# Patient Record
Sex: MALE | Race: WHITE | NOT HISPANIC OR LATINO | Employment: FULL TIME | URBAN - METROPOLITAN AREA
[De-identification: names, ages, dates, MRNs, and addresses within clinical notes are randomized per-mention and may not be internally consistent; named-entity substitution may affect disease eponyms.]

---

## 2018-01-17 ENCOUNTER — LAB REQUISITION (OUTPATIENT)
Dept: LAB | Facility: HOSPITAL | Age: 58
End: 2018-01-17
Payer: OTHER GOVERNMENT

## 2018-01-17 ENCOUNTER — ALLSCRIPTS OFFICE VISIT (OUTPATIENT)
Dept: OTHER | Facility: OTHER | Age: 58
End: 2018-01-17

## 2018-01-17 DIAGNOSIS — N20.1 CALCULUS OF URETER: ICD-10-CM

## 2018-01-17 DIAGNOSIS — N13.5 CROSSING VESSEL AND STRICTURE OF URETER WITHOUT HYDRONEPHROSIS: ICD-10-CM

## 2018-01-17 LAB
CLARITY UR: NORMAL
COLOR UR: YELLOW
GLUCOSE (HISTORICAL): NORMAL
HGB UR QL STRIP.AUTO: NORMAL
KETONES UR STRIP-MCNC: NORMAL MG/DL
LEUKOCYTE ESTERASE UR QL STRIP: NORMAL
NITRITE UR QL STRIP: NORMAL
PH UR STRIP.AUTO: 5 [PH]
PROT UR STRIP-MCNC: NORMAL MG/DL
SP GR UR STRIP.AUTO: 1.01

## 2018-01-17 PROCEDURE — 87086 URINE CULTURE/COLONY COUNT: CPT | Performed by: UROLOGY

## 2018-01-18 LAB — BACTERIA UR CULT: NORMAL

## 2018-01-18 NOTE — CONSULTS
Assessment   1  Hydronephrosis (591) (N13 30)   2  UPJ (ureteropelvic junction) obstruction (593 4) (N13 5)   3  Bladder cancer (188 9) (C67 9)    Plan   Hydronephrosis, UPJ (ureteropelvic junction) obstruction    · Urine Dip Non-Automated- POC; Status:Complete - Retrospective By Protocol    Authorization;   Done: [id number] 02:01PM   Performed: In Office; [id number]; Last Updated By:Nikky Daley; 1/17/2018 2:03:04 PM;Ordered;For:Hydronephrosis, UPJ (ureteropelvic junction) obstruction; Ordered By:Nhi Capellan;  UPJ (ureteropelvic junction) obstruction, Ureteral stone    · (1) URINE CULTURE; Source:Urine, Clean Catch; Status:Active - Retrospective By    Protocol Authorization; Requested [id number]; Perform:[facility]; [id number]; Last Updated By:Nikky Daley; 1/17/2018 2:28:09 PM;Ordered; For:UPJ (ureteropelvic junction) obstruction, Ureteral stone; Ordered By:Nhi Capellan;  Ureteral stone    · Schedule Surgery Treatment  Procedure:  Cystoscopy, right ureteroscopy, laser    lithotripsy, stone extraction, right retrograde pyelogram, and right ureteral stent    placement  Needs cardiac clearance  Status: Hold For - Scheduling     Requested for: [id number]   Ordered; For: Ureteral stone; Ordered By: Torsten Shaffer Performed:  Due: [id number]    Discussion/Summary   Discussion Summary:    51-year-old male with nephrolithiasis  discussed that on the right side he has a very large stone seems to be obstructing his kidney side lately  He needs to proceed with right-sided ureteroscopy with laser lithotripsy stone extraction and stent placement  Risks and benefits were discussed  We discussed that the stone may be impacted and difficult to get to and he may need 2 procedures 1 in which we just stent the kidney  He was consented for the surgery will schedule in the near future        Chief Complaint   Chief Complaint Free Text Note Form: Patient presents for Right UPJ stone, Hydronephrosis      History of Present Illness   HPI: 44-year-old male presents for evaluation of nephrolithiasis  The patient was noted to have bilateral stones on a recent CT scan that was done during hospitalization for congestive heart failure  On the right side there was a 1 5 x 0 9 centimeter UPJ stone with associated hydronephrosis  The patient denies any pain or symptoms from this  He denies any gross hematuria  He does have a distant history of bladder cancer that was diagnosed in 2008 when he had a kidney stone episode  The tumors were noted during cystoscopy for planned ureteroscopy  It sounds like he followed up for a few years and then stopped following up with Urology for surveillance  He denies any gross hematuria  Review of Systems   Complete-Male Urology:      Constitutional: No fever or chills, feels well, no tiredness, no recent weight gain or weight loss  Respiratory: No complaints of shortness of breath, no wheezing, no cough, no SOB on exertion, no orthopnea or PND  Cardiovascular: No complaints of slow heart rate, no fast heart rate, no chest pain, no palpitations, no leg claudication, no lower extremity  Gastrointestinal: No complaints of abdominal pain, no constipation, no nausea or vomiting, no diarrhea or bloody stools  Genitourinary: Empty sensation-- and-- stream quality good, but-- no dysuria,-- no urinary hesitancy,-- no hematuria,-- no incontinence,-- no nocturia-- and-- no feelings of urinary urgency  Musculoskeletal: No complaints of arthralgia, no myalgias, no joint swelling or stiffness, no limb pain or swelling  Integumentary: No complaints of skin rash or skin lesions, no itching, no skin wound, no dry skin  Hematologic/Lymphatic: No complaints of swollen glands, no swollen glands in the neck, does not bleed easily, no easy bruising        Neurological: No compliants of headache, no confusion, no convulsions, no numbness or tingling, no dizziness or fainting, no limb weakness, no difficulty walking  ROS Reviewed:    ROS reviewed  Active Problems   1  Hydronephrosis (591) (N13 30)   2  UPJ (ureteropelvic junction) obstruction (593 4) (N13 5)    Past Medical History   Active Problems And Past Medical History Reviewed: The active problems and past medical history were reviewed and updated today  Surgical History   Surgical History Reviewed: The surgical history was reviewed and updated today  Family History   Family History Reviewed: The family history was reviewed and updated today  Social History   Social History Reviewed: The social history was reviewed and updated today  Current Meds    1  Adult Aspirin Low Strength 81 MG TBDP; Therapy: (Recorded:17Jan2018) to Recorded   2  Atorvastatin Calcium 20 MG Oral Tablet; Therapy: (Recorded:17Jan2018) to Recorded   3  Benazepril HCl - 40 MG Oral Tablet; Therapy: (Recorded:17Jan2018) to Recorded   4  Furosemide 40 MG Oral Tablet; Therapy: (Recorded:17Jan2018) to Recorded   5  Levaquin 750 MG Oral Tablet; Therapy: [id number] to (Last Rx:11Mar2011)  Requested for: 53UDG2987 Ordered   6  MetFORMIN HCl ER (OSM) 1000 MG Oral Tablet Extended Release 24 Hour; Therapy: (Recorded:17Jan2018) to Recorded   7  Metoprolol Succinate ER 50 MG Oral Tablet Extended Release 24 Hour; Therapy: 65MEP6160 to (Last Kyung Ebbing)  Requested for: 26Apr2011 Ordered   8  Spironolactone 25 MG Oral Tablet; Therapy: (Recorded:17Jan2018) to Recorded  Medication List Reviewed: The medication list was reviewed and updated today  Vitals   Vital Signs    Recorded: 49MLR3896 02:00PM   Heart Rate 88   Systolic 929   Diastolic 80   Height 5 ft 10 in   Weight 342 lb    BMI Calculated 49 07   BSA Calculated 2 62     Physical Exam        Constitutional      General appearance: Abnormal  -- Obese  Pulmonary      Respiratory effort: No increased work of breathing or signs of respiratory distress  Cardiovascular      Examination of extremities for edema and/or varicosities: Normal        Abdomen      Abdomen: Non-tender, no masses  Liver and spleen: No hepatomegaly or splenomegaly  Musculoskeletal      Gait and station: Normal        Skin      Skin and subcutaneous tissue: Normal without rashes or lesions         Results/Data   Urine Dip Non-Automated- POC [id number] 02:01PM Frederick Hu      Test Name Result Flag Reference   Color Yellow     Clarity Transparent     Leukocytes -     Nitrite -     Blood +++     Protein -     Ph 5 0     Specific Gravity 1 015     Ketone -     Glucose -          Signatures    Electronically signed by : CHASTITY Ayala ; Jan 17 2018  2:59PM EST                       (Author)

## 2018-01-22 VITALS
HEART RATE: 88 BPM | WEIGHT: 315 LBS | BODY MASS INDEX: 45.1 KG/M2 | DIASTOLIC BLOOD PRESSURE: 80 MMHG | HEIGHT: 70 IN | SYSTOLIC BLOOD PRESSURE: 152 MMHG

## 2018-01-24 ENCOUNTER — PREP FOR PROCEDURE (OUTPATIENT)
Dept: UROLOGY | Facility: HOSPITAL | Age: 58
End: 2018-01-24

## 2018-01-24 DIAGNOSIS — N20.1 CALCULUS OF URETER: Primary | ICD-10-CM

## 2018-02-01 ENCOUNTER — TELEPHONE (OUTPATIENT)
Dept: UROLOGY | Facility: AMBULATORY SURGERY CENTER | Age: 58
End: 2018-02-01

## 2018-02-27 RX ORDER — ACETAMINOPHEN 500 MG
1000 TABLET ORAL 2 TIMES DAILY
COMMUNITY

## 2018-02-27 RX ORDER — BENAZEPRIL HYDROCHLORIDE 40 MG/1
40 TABLET, FILM COATED ORAL DAILY
COMMUNITY

## 2018-02-27 RX ORDER — MAGNESIUM OXIDE 400 MG/1
400 TABLET ORAL 2 TIMES DAILY
COMMUNITY

## 2018-02-27 RX ORDER — OMEPRAZOLE 20 MG/1
20 CAPSULE, DELAYED RELEASE ORAL DAILY
COMMUNITY

## 2018-02-27 RX ORDER — SPIRONOLACTONE 25 MG/1
25 TABLET ORAL 2 TIMES DAILY
COMMUNITY

## 2018-02-27 RX ORDER — FUROSEMIDE 40 MG/1
40 TABLET ORAL 2 TIMES DAILY
COMMUNITY

## 2018-02-27 RX ORDER — FERROUS SULFATE 325(65) MG
325 TABLET ORAL 2 TIMES DAILY WITH MEALS
COMMUNITY

## 2018-02-27 RX ORDER — METOPROLOL TARTRATE 50 MG/1
75 TABLET, FILM COATED ORAL EVERY 12 HOURS SCHEDULED
COMMUNITY

## 2018-02-27 RX ORDER — ASPIRIN 81 MG/1
81 TABLET ORAL DAILY
COMMUNITY

## 2018-02-27 NOTE — PRE-PROCEDURE INSTRUCTIONS
The following information was developed to assist you to prepare for your operation  What do I need to do before coming to the hospital?  · Arrange for a responsible person to drive you to and from the hospital   · Arrange care for your children at home  Children are not allowed in the recovery area of the hospital   · Plan to wear clothing that is easy to put on and take off  If you are having shoulder surgery, wear a shirt that buttons or zippers in front  Bathing  · Shower the evening before and the morning of your surgery with antibacterial soap  Please refer to the Pre Op Showering Instructions for Surgery Patient Sheet  · Remove nail polish and all body piercing jewelry  · Do not shave any body part for at least 24 hours before surgery-this includes face, arm, legs and upper body  Food  · Nothing to eat or drink after midnight the night before your surgery  This includes candy and chewing gum  Exception: If your surgery is after 12:00 pm (noon), you may have clear liquids such as 7-Up, ginger ale, apple or cranberry juice, Jello-O, water, or clear broth until 8:00 am   · Do not drink mild or juice with pulp on the morning before surgery  · Do not drink alcohol 24 hours before surgery  · Do not smoke 12 hours before surgery  Medicine  · Follow instructions you are received from your surgeon about which medicines you may take on the day of surgery  · If instructed to take medicine on the morning of surgery, take pills with just a small sip of water  Call your prescribing doctor for specific instructions on what to do if you take insulin  What should I bring to the hospital?  Bring  · Crutches or a walker, if you have them, for foot or knee surgery  · A list of the daily medications, vitamins, minerals, herbals and nutritional supplements you take  Include the dosages of medicines and the time you take them each day    · Glasses, dentures or hearing aids  · Minimal clothing; you will be wearing hospital sleepwear  · Photo ID; required to verify your identity  · If you have a Living Will or Power of , bring a copy of the documents  (only if being admitted)  · If you have an ostomy, bring an extra pouch and any supplies you use  Do Not Bring  · Medicines or Inhalers  · Money, valuables or jewelry    What other information should I know about the day of surgery? · Notify your surgeon if you develop a cold, sore throat, cough, fever, rash or any other illness  · Report to the Ambulatory Surgical/Same Day Surgery Unit  You will be instructed to stop at Registration only if you have not been pre-registered  · Inform your  if they do not stay that they will be asked by the staff to leave a phone number where they can be reached  · Be available to be reached before surgery  In the even the operating room schedule changes, you may be asked to come in earlier or later than expected  It is important to tell your doctor and others involved in your health care if you are taking or have been taking any non-prescription drugs, vitamins, minerals, herbals or other nutritional supplements  Any of these may interact with some food or medicines and cause a reaction  Before your operation, you play an important role in decreasing your risk for infection by washing with special antiseptic soap  This is an effective way to reduce bacteria on the skin which may help to prevent infections at the surgical site  Please read the following directions in advance  2  In the week before your operation purchase a 4 ounce bottle of antiseptic soap containing chlorhexidine gluconate 4%  Some brand names include: Aplicare, Endure, and Hibiclens  The cost is usually less than $5 00  · For your convenience, the [address] [address] carries the soap  · It may also be available at your doctor's office or pre-admission testing center, and at most retail pharmacies    · If you are allergic or sensitive to soaps containing chlorhexidine gluconate (CHG), please let your doctor know so another antiseptic soap can be suggested  · CHG antiseptic soap is for external use only  2      The day before your operation follow these directions carefully to get ready  · Place clean lines (sheets) on your bed; you should sleep on clean sheets after your evening shower  · Get clean towels and washcloths ready - you need enough for 2 showers  · Set aside clean underwear, pajamas, and clothing to wear after the shower  Reminders:  · DO NOT use any other soap or body rinse on your skin during or after the antiseptic showers  · DO NOT use lotion , powder, deodorant, or perfume/aftershave of any kind on your skin after your antiseptic shower  · DO NOT shave any body parts in the 24 hours/the day before your operation  · DO NOT get the antiseptic soap in your eyes, ears, nose, mouth, or vaginal area  3      You will need to shower the night before AND the morning of your Surgery  Shower 1:  · The evening before your operation, take the fist shower  · First, shampoo your hair with regular shampoo and rinse it completely before you use the anitseptic soap  After washing and rinsing your hair, rinse your body  · Next, use a clean wash cloth to apply the antiseptic soap and wash your body from the neck down to your toes using 1/2 bottle of the antiseptic soap  You will use the other 1/2 bottle for the second shower  · Clean the area where your incision will be; later this area well for about 2 minutes  · If you ar having head or neck surgery, wash areas with the antiseptic soap  · Rinse yourself completely with running water  · Use a clean towel to dry off  · Wear clean underwear and clothing/pajamas  Shower 2:  · The Morning of your operation, take the second shower following the same steps as Shower 1 using the second 1/2 of the bottle of antiseptic soap    · Use clean cloths and towels to was and dry yourself off  · Wear clean underwear and clothing  Pre-Surgery Instructions:   Medication Instructions    acetaminophen (TYLENOL) 500 mg tablet Patient was instructed by Physician and understands   ACIDOPHILUS LACTOBACILLUS PO Patient was instructed by Physician and understands   aspirin (ECOTRIN LOW STRENGTH) 81 mg EC tablet Patient was instructed by Physician and understands   ATORVASTATIN CALCIUM PO Patient was instructed by Physician and understands   benazepril (LOTENSIN) 40 MG tablet Patient was instructed by Physician and understands   cyanocobalamin 1000 MCG tablet Patient was instructed by Physician and understands   ferrous sulfate 325 (65 Fe) mg tablet Patient was instructed by Physician and understands   furosemide (LASIX) 40 mg tablet Patient was instructed by Physician and understands   magnesium oxide (MAG-OX) 400 mg tablet Patient was instructed by Physician and understands   metFORMIN (GLUCOPHAGE) 1000 MG tablet Instructed patient per Anesthesia Guidelines   metoprolol tartrate (LOPRESSOR) 50 mg tablet Instructed patient per Anesthesia Guidelines   spironolactone (ALDACTONE) 25 mg tablet Instructed patient per Anesthesia Guidelines

## 2018-03-02 ENCOUNTER — ANESTHESIA EVENT (OUTPATIENT)
Dept: PERIOP | Facility: HOSPITAL | Age: 58
End: 2018-03-02
Payer: OTHER GOVERNMENT

## 2018-03-02 ENCOUNTER — APPOINTMENT (OUTPATIENT)
Dept: RADIOLOGY | Facility: HOSPITAL | Age: 58
End: 2018-03-02
Payer: OTHER GOVERNMENT

## 2018-03-02 ENCOUNTER — HOSPITAL ENCOUNTER (OUTPATIENT)
Facility: HOSPITAL | Age: 58
Setting detail: OUTPATIENT SURGERY
Discharge: HOME | End: 2018-03-02
Attending: UROLOGY | Admitting: UROLOGY
Payer: OTHER GOVERNMENT

## 2018-03-02 ENCOUNTER — ANESTHESIA (OUTPATIENT)
Dept: PERIOP | Facility: HOSPITAL | Age: 58
End: 2018-03-02
Payer: OTHER GOVERNMENT

## 2018-03-02 VITALS
BODY MASS INDEX: 45.1 KG/M2 | HEIGHT: 70 IN | TEMPERATURE: 97.5 F | OXYGEN SATURATION: 95 % | WEIGHT: 315 LBS | SYSTOLIC BLOOD PRESSURE: 143 MMHG | HEART RATE: 92 BPM | DIASTOLIC BLOOD PRESSURE: 84 MMHG | RESPIRATION RATE: 20 BRPM

## 2018-03-02 DIAGNOSIS — N20.1 CALCULUS OF URETER: ICD-10-CM

## 2018-03-02 LAB
ANION GAP SERPL CALCULATED.3IONS-SCNC: 12 MMOL/L (ref 4–13)
APTT PPP: 24 SECONDS (ref 23–35)
BASOPHILS # BLD AUTO: 0.05 THOUSANDS/ΜL (ref 0–0.1)
BASOPHILS NFR BLD AUTO: 1 % (ref 0–1)
BUN SERPL-MCNC: 18 MG/DL (ref 5–25)
CALCIUM SERPL-MCNC: 9.2 MG/DL (ref 8.3–10.1)
CHLORIDE SERPL-SCNC: 102 MMOL/L (ref 100–108)
CO2 SERPL-SCNC: 26 MMOL/L (ref 21–32)
CREAT SERPL-MCNC: 1.47 MG/DL (ref 0.6–1.3)
EOSINOPHIL # BLD AUTO: 0.18 THOUSAND/ΜL (ref 0–0.61)
EOSINOPHIL NFR BLD AUTO: 2 % (ref 0–6)
ERYTHROCYTE [DISTWIDTH] IN BLOOD BY AUTOMATED COUNT: 15.5 % (ref 11.6–15.1)
GFR SERPL CREATININE-BSD FRML MDRD: 53 ML/MIN/1.73SQ M
GLUCOSE P FAST SERPL-MCNC: 175 MG/DL (ref 65–99)
GLUCOSE SERPL-MCNC: 163 MG/DL (ref 65–140)
GLUCOSE SERPL-MCNC: 175 MG/DL (ref 65–140)
HCT VFR BLD AUTO: 36 % (ref 36.5–49.3)
HGB BLD-MCNC: 12.1 G/DL (ref 12–17)
INR PPP: 0.92 (ref 0.86–1.16)
LYMPHOCYTES # BLD AUTO: 1.7 THOUSANDS/ΜL (ref 0.6–4.47)
LYMPHOCYTES NFR BLD AUTO: 20 % (ref 14–44)
MCH RBC QN AUTO: 31.8 PG (ref 26.8–34.3)
MCHC RBC AUTO-ENTMCNC: 33.6 G/DL (ref 31.4–37.4)
MCV RBC AUTO: 95 FL (ref 82–98)
MONOCYTES # BLD AUTO: 0.67 THOUSAND/ΜL (ref 0.17–1.22)
MONOCYTES NFR BLD AUTO: 8 % (ref 4–12)
NEUTROPHILS # BLD AUTO: 5.94 THOUSANDS/ΜL (ref 1.85–7.62)
NEUTS SEG NFR BLD AUTO: 69 % (ref 43–75)
PLATELET # BLD AUTO: 261 THOUSANDS/UL (ref 149–390)
PMV BLD AUTO: 10.4 FL (ref 8.9–12.7)
POTASSIUM SERPL-SCNC: 4.6 MMOL/L (ref 3.5–5.3)
PROTHROMBIN TIME: 12.2 SECONDS (ref 12.1–14.4)
RBC # BLD AUTO: 3.81 MILLION/UL (ref 3.88–5.62)
SODIUM SERPL-SCNC: 140 MMOL/L (ref 136–145)
WBC # BLD AUTO: 8.54 THOUSAND/UL (ref 4.31–10.16)

## 2018-03-02 PROCEDURE — 82948 REAGENT STRIP/BLOOD GLUCOSE: CPT

## 2018-03-02 PROCEDURE — 85730 THROMBOPLASTIN TIME PARTIAL: CPT | Performed by: UROLOGY

## 2018-03-02 PROCEDURE — 52356 CYSTO/URETERO W/LITHOTRIPSY: CPT | Performed by: UROLOGY

## 2018-03-02 PROCEDURE — C1769 GUIDE WIRE: HCPCS | Performed by: UROLOGY

## 2018-03-02 PROCEDURE — 88300 SURGICAL PATH GROSS: CPT | Performed by: UROLOGY

## 2018-03-02 PROCEDURE — C2617 STENT, NON-COR, TEM W/O DEL: HCPCS | Performed by: UROLOGY

## 2018-03-02 PROCEDURE — 74450 X-RAY URETHRA/BLADDER: CPT

## 2018-03-02 PROCEDURE — 82360 CALCULUS ASSAY QUANT: CPT | Performed by: UROLOGY

## 2018-03-02 PROCEDURE — 80048 BASIC METABOLIC PNL TOTAL CA: CPT | Performed by: UROLOGY

## 2018-03-02 PROCEDURE — 85610 PROTHROMBIN TIME: CPT | Performed by: UROLOGY

## 2018-03-02 PROCEDURE — C1758 CATHETER, URETERAL: HCPCS | Performed by: UROLOGY

## 2018-03-02 PROCEDURE — 85025 COMPLETE CBC W/AUTO DIFF WBC: CPT | Performed by: UROLOGY

## 2018-03-02 PROCEDURE — 88300 SURGICAL PATH GROSS: CPT | Performed by: PATHOLOGY

## 2018-03-02 DEVICE — INLAY OPTIMA URETERAL STENT W/O GUIDEWIRE
Type: IMPLANTABLE DEVICE | Site: URETER | Status: FUNCTIONAL
Brand: BARD® INLAY OPTIMA® URETERAL STENT

## 2018-03-02 RX ORDER — MIDAZOLAM HYDROCHLORIDE 1 MG/ML
INJECTION INTRAMUSCULAR; INTRAVENOUS AS NEEDED
Status: DISCONTINUED | OUTPATIENT
Start: 2018-03-02 | End: 2018-03-02 | Stop reason: SURG

## 2018-03-02 RX ORDER — CHLORHEXIDINE GLUCONATE 4 G/100ML
SOLUTION TOPICAL DAILY PRN
Status: DISCONTINUED | OUTPATIENT
Start: 2018-03-02 | End: 2018-03-02 | Stop reason: HOSPADM

## 2018-03-02 RX ORDER — FENTANYL CITRATE/PF 50 MCG/ML
50 SYRINGE (ML) INJECTION
Status: DISCONTINUED | OUTPATIENT
Start: 2018-03-02 | End: 2018-03-02 | Stop reason: HOSPADM

## 2018-03-02 RX ORDER — GLYCOPYRROLATE 0.2 MG/ML
INJECTION INTRAMUSCULAR; INTRAVENOUS AS NEEDED
Status: DISCONTINUED | OUTPATIENT
Start: 2018-03-02 | End: 2018-03-02 | Stop reason: SURG

## 2018-03-02 RX ORDER — PROPOFOL 10 MG/ML
INJECTION, EMULSION INTRAVENOUS AS NEEDED
Status: DISCONTINUED | OUTPATIENT
Start: 2018-03-02 | End: 2018-03-02 | Stop reason: SURG

## 2018-03-02 RX ORDER — LIDOCAINE HYDROCHLORIDE 10 MG/ML
INJECTION, SOLUTION INFILTRATION; PERINEURAL AS NEEDED
Status: DISCONTINUED | OUTPATIENT
Start: 2018-03-02 | End: 2018-03-02 | Stop reason: SURG

## 2018-03-02 RX ORDER — METOCLOPRAMIDE HYDROCHLORIDE 5 MG/ML
10 INJECTION INTRAMUSCULAR; INTRAVENOUS ONCE AS NEEDED
Status: DISCONTINUED | OUTPATIENT
Start: 2018-03-02 | End: 2018-03-02 | Stop reason: HOSPADM

## 2018-03-02 RX ORDER — ONDANSETRON 2 MG/ML
INJECTION INTRAMUSCULAR; INTRAVENOUS AS NEEDED
Status: DISCONTINUED | OUTPATIENT
Start: 2018-03-02 | End: 2018-03-02 | Stop reason: SURG

## 2018-03-02 RX ORDER — HYDROCODONE BITARTRATE AND ACETAMINOPHEN 5; 325 MG/1; MG/1
1 TABLET ORAL EVERY 4 HOURS PRN
Qty: 20 TABLET | Refills: 0 | Status: SHIPPED | OUTPATIENT
Start: 2018-03-02 | End: 2018-03-12

## 2018-03-02 RX ORDER — CIPROFLOXACIN 500 MG/1
500 TABLET, FILM COATED ORAL 2 TIMES DAILY
Qty: 6 TABLET | Refills: 0 | Status: SHIPPED | OUTPATIENT
Start: 2018-03-02 | End: 2018-03-05

## 2018-03-02 RX ORDER — METOCLOPRAMIDE HYDROCHLORIDE 5 MG/ML
INJECTION INTRAMUSCULAR; INTRAVENOUS AS NEEDED
Status: DISCONTINUED | OUTPATIENT
Start: 2018-03-02 | End: 2018-03-02 | Stop reason: SURG

## 2018-03-02 RX ORDER — MAGNESIUM HYDROXIDE 1200 MG/15ML
LIQUID ORAL AS NEEDED
Status: DISCONTINUED | OUTPATIENT
Start: 2018-03-02 | End: 2018-03-02 | Stop reason: HOSPADM

## 2018-03-02 RX ORDER — HYDROCODONE BITARTRATE AND ACETAMINOPHEN 5; 325 MG/1; MG/1
2 TABLET ORAL EVERY 4 HOURS PRN
Status: DISCONTINUED | OUTPATIENT
Start: 2018-03-02 | End: 2018-03-02 | Stop reason: HOSPADM

## 2018-03-02 RX ORDER — FENTANYL CITRATE 50 UG/ML
INJECTION, SOLUTION INTRAMUSCULAR; INTRAVENOUS AS NEEDED
Status: DISCONTINUED | OUTPATIENT
Start: 2018-03-02 | End: 2018-03-02 | Stop reason: SURG

## 2018-03-02 RX ORDER — SODIUM CHLORIDE, SODIUM LACTATE, POTASSIUM CHLORIDE, CALCIUM CHLORIDE 600; 310; 30; 20 MG/100ML; MG/100ML; MG/100ML; MG/100ML
125 INJECTION, SOLUTION INTRAVENOUS CONTINUOUS
Status: DISCONTINUED | OUTPATIENT
Start: 2018-03-02 | End: 2018-03-02 | Stop reason: HOSPADM

## 2018-03-02 RX ADMIN — FENTANYL CITRATE 75 MCG: 50 INJECTION, SOLUTION INTRAMUSCULAR; INTRAVENOUS at 10:44

## 2018-03-02 RX ADMIN — ONDANSETRON 4 MG: 2 INJECTION INTRAMUSCULAR; INTRAVENOUS at 12:15

## 2018-03-02 RX ADMIN — PROPOFOL 200 MG: 10 INJECTION, EMULSION INTRAVENOUS at 10:37

## 2018-03-02 RX ADMIN — PROPOFOL 60 MG: 10 INJECTION, EMULSION INTRAVENOUS at 11:51

## 2018-03-02 RX ADMIN — MIDAZOLAM HYDROCHLORIDE 2 MG: 1 INJECTION, SOLUTION INTRAMUSCULAR; INTRAVENOUS at 10:34

## 2018-03-02 RX ADMIN — Medication 3000 MG: at 10:34

## 2018-03-02 RX ADMIN — GLYCOPYRROLATE 0.15 MG: 0.2 INJECTION, SOLUTION INTRAMUSCULAR; INTRAVENOUS at 10:34

## 2018-03-02 RX ADMIN — LIDOCAINE HYDROCHLORIDE 15 MG: 10 INJECTION, SOLUTION INFILTRATION; PERINEURAL at 10:34

## 2018-03-02 RX ADMIN — SODIUM CHLORIDE, SODIUM LACTATE, POTASSIUM CHLORIDE, AND CALCIUM CHLORIDE 125 ML/HR: .6; .31; .03; .02 INJECTION, SOLUTION INTRAVENOUS at 09:13

## 2018-03-02 RX ADMIN — FENTANYL CITRATE 25 MCG: 50 INJECTION, SOLUTION INTRAMUSCULAR; INTRAVENOUS at 10:36

## 2018-03-02 RX ADMIN — METOCLOPRAMIDE HYDROCHLORIDE 10 MG: 5 INJECTION INTRAMUSCULAR; INTRAVENOUS at 10:44

## 2018-03-02 NOTE — H&P
UROLOGY H&P NOTE     Patient Identifiers: Jone Erazo (MRN [id number])    Date of Service: 3/2/2018    History of Present Illness:     Jone Record is a 56 y o  old with a history of nephrolithiasis and bladder cancer  The patient was found to have a large right-sided UPJ stone during recent hospitalization and is here for ureteroscopy with laser lithotripsy  He denies any changes to his health  He has no new complaints  Past Medical, Past Surgical History:     Past Medical History:   Diagnosis Date    Cancer [facility])     Bladder 2011    Congestive heart failure (CHF) ([facility] ) 2017    CPAP (continuous positive airway pressure) dependence     Diabetes mellitus ([facility] )     Hiatal hernia     Hypertension     Kidney stone     Sleep apnea    :    Past Surgical History:   Procedure Laterality Date    CHOLECYSTECTOMY      CYSTOSCOPY      TONSILLECTOMY      UMBILICAL HERNIA REPAIR      WISDOM TOOTH EXTRACTION Bilateral    :    Medications, Allergies:     Current Facility-Administered Medications   Medication Dose Route Frequency    ceFAZolin (ANCEF) 3,000 mg in dextrose 5 % 100 mL IVPB  3,000 mg Intravenous Once    chlorhexidine (HIBICLENS) 4 % topical liquid   Topical Daily PRN    lactated ringers infusion  125 mL/hr Intravenous Continuous       Allergies:  No Known Allergies:    Social and Family History:   Social History:   Social History   Substance Use Topics    Smoking status: Former Smoker    Smokeless tobacco: Never Used      Comment: Quit 35 years ago    Alcohol use 2 4 oz/week     4 Cans of beer per week         History   Smoking Status    Former Smoker   Smokeless Tobacco    Never Used     Comment: Quit 35 years ago       Family History:  Family History   Problem Relation Age of Onset    Lung cancer Mother     No Known Problems Father     No Known Problems Brother     No Known Problems Brother    :     Review of Systems:     General: Fever, chills, or night sweats: negative  Cardiac: Negative for chest pain  Pulmonary: Negative for shortness of breath  Gastrointestinal: Abdominal pain negative  Nausea, vomiting, or diarrhea negative,  Genitourinary: See HPI above  Patient does not have hematuria  All other systems queried were negative  Physical Exam:   General: Patient is pleasant and in NAD  Awake and alert  BP (!) 180/87   Pulse 82   Temp 97 8 °F (36 6 °C) (Oral)   Resp (!) 24   Ht 5' 10" (1 778 m)   Wt (!) 154 kg (340 lb)   SpO2 94%   BMI 48 78 kg/m² Temp (24hrs), Av 8 °F (36 6 °C), Min:97 8 °F (36 6 °C), Max:97 8 °F (36 6 °C)  current; Temperature: 97 8 °F (36 6 °C)  I/O last 24 hours: In: 50 [I V :50]  Out: -   Cardiac: Peripheral edema: negative  Pulmonary: Non-labored breathing  Abdomen: Soft, non-tender, non-distended  No surgical scars  No masses, tenderness, hernias noted  Genitourinary: Negative CVA tenderness, negative suprapubic tenderness  Labs:     Lab Results   Component Value Date    HGB 12 1 2018    HCT 36 0 (L) 2018    WBC 8 54 2018     2018   ]    Lab Results   Component Value Date     2018    K 4 6 2018     2018    CO2 26 2018    BUN 18 2018    CREATININE 1 47 (H) 2018    CALCIUM 9 2 2018    GLUCOSE 175 (H) 2018   ]    Imaging:   I personally reviewed the images and report of the following studies, and reviewed them with the patient:    none      ASSESSMENT:     56 y o  old male with  right UPJ stone  PLAN:     Plan is for right-sided ureteroscopy with laser lithotripsy stone extraction and stent placement  Risks and benefits were discussed with the patient he was consented    He is cleared for surgery        Steve Arenas MD

## 2018-03-02 NOTE — DISCHARGE INSTRUCTIONS
CYSTOSCOPY, TURBT, PROSTATE BIOPSY, BLADDER BIOPSY   DISCHARGE INSTRUCTIONS    Care after your surgery:  1  You may have burning or red colored urine the first 24 hours  Your burning should  stop in 24 hours and your urine should clear in 24-48 hours  2  You should drink more fluids unless Dr Paul Ballesteros advises you not to    3  You should return to normal activities when your urine is clear again  4  You may have a small amount of red drainage from your rectum if you had a prostate  biopsy performed  This should stop in 24 hours  5  Take pain medication as prescribed by your doctor  Call Dr Paul Ballesteros if you have any of the followin  You can not urinate or you have active or persistent bleeding, clots, back pain, or  pain when you urinate  2  You have chills, fever above 101 degrees, or feel sick  3  You have persistent nausea or vomiting, persistent dizziness, or lightheadedness  4  Call Dr Paul Ballesteros if you have any questions or concerns about your procedure at:  [phone]      Stent can be removed by pulling on string on Wednesday   Take pain medicine prior to removal  The office will contact you to see if you would like to come in for removal

## 2018-03-02 NOTE — OP NOTE
OPERATIVE REPORT  PATIENT NAME: Rahul Steven    :  1960  MRN: [id number]  Pt Location:  OR ROOM 02    SURGERY DATE: 3/2/2018    Surgeon(s) and Role:     Donna Snyder MD - Primary    Preop Diagnosis:  Calculus of ureter [N20 1]    Post-Op Diagnosis Codes:     * Calculus of ureter [N20 1]    Procedure(s) (LRB):  CYSTOSCOPY URETEROSCOPY WITH LITHOTRIPSY HOLMIUM LASER, RETROGRADE PYELOGRAM AND INSERTION STENT URETERAL (Right)    Specimen(s):  ID Type Source Tests Collected by Time Destination   1 : right ureteral calculus Calculus Ureter, Right TISSUE EXAM Polina Poole MD 3/2/2018 1231        Estimated Blood Loss:   0 mL    Drains:       Anesthesia Type:   General    Operative Indications:  Calculus of ureter [N20 1]      Operative Findings:  2cm impacted UPJ stone and severe hydronephrosis    Complications:   None    Procedure and Technique:  After informed consent was obtained the patient was brought to the operating room  Preoperative antibiotics were given for infection prophylaxis  Bilateral sequential compressive devices were placed on the lower extremities for DVT prophylaxis  A timeout was performed to identify the patient, surgery to be performed, and correct laterality  The patient was then prepped and draped in standard sterile fashion in the dorsal lithotomy position  A 22 Bulgarian rigid cystoscope was inserted per urethra and into the bladder  A diagnostic cystoscopy was performed that demonstrated no urothelial lesions  The right Ureteral orifice was identified and cannulated with a sensor guidewire up to the level of the renal pelvis under fluoroscopic guidance  The cystoscope was removed and a semirigid ureteroscope was then advanced up the ureter alongside the wire until the stone was encountered at the proximal ureter  The stone was fragmented with a holmium laser and the fragments were removed with a basket   Some of the stone flew into the renal pelvis and flexible ureteroscopy was then performed and the stones were fragmented with a holmium laser  Once were were satisfied that the stones had been fragmented as small as possible the scope was backed out and the entire ureter was inspected  A 5 Yi open-ended ureteral catheter was then placed over the remaining guidewire and a retrograde pyelogram was performed that demonstrated severe hydronephrosis and no filling defects  The wire was then replaced and the open-ended ureteral catheter was removed  A [id number] stent was placed under fluoroscopic guidance over the wire  Image of the stent coiled within the renal pelvis and bladder were saved for the medical record  A string was left on the stent and taped to the patient's penis The patient was awoken and taken to the postanesthesia care unit in stable condition           I was present for the entire procedure    Patient Disposition:  PACU     SIGNATURE: Kasey Key MD  DATE: March 2, 2018  TIME: 12:40 PM

## 2018-03-02 NOTE — ANESTHESIA PREPROCEDURE EVALUATION
Review of Systems/Medical History  Patient summary reviewed  Chart reviewed      Cardiovascular  EKG reviewed, Hypertension controlled, CHF compensated CHF,    Pulmonary  Sleep apnea CPAP and Sleep Study completed,        GI/Hepatic    GERD well controlled,  Hiatal hernia,        Kidney stones,        Endo/Other  Diabetes well controlled type 2 Oral agent,   Obesity  morbid obesity   GYN       Hematology  Negative hematology ROS      Musculoskeletal    Arthritis     Neurology  Negative neurology ROS      Psychology   Negative psychology ROS              Physical Exam    Airway    Mallampati score: II  TM Distance: >3 FB  Neck ROM: full     Dental   No notable dental hx     Cardiovascular  Cardiovascular exam normal    Pulmonary  Pulmonary exam normal     Other Findings        Anesthesia Plan  ASA Score- 3     Anesthesia Type- general with ASA Monitors  Additional Monitors:   Airway Plan: LMA  Plan Factors-    Induction- intravenous  Postoperative Plan- Plan for postoperative opioid use  Informed Consent- Anesthetic plan and risks discussed with patient

## 2018-03-03 LAB — GLUCOSE SERPL-MCNC: 137 MG/DL (ref 65–140)

## 2018-03-05 ENCOUNTER — LAB REQUISITION (OUTPATIENT)
Dept: LAB | Facility: HOSPITAL | Age: 58
End: 2018-03-05
Payer: OTHER GOVERNMENT

## 2018-03-05 ENCOUNTER — TELEPHONE (OUTPATIENT)
Dept: UROLOGY | Facility: HOSPITAL | Age: 58
End: 2018-03-05

## 2018-03-05 DIAGNOSIS — N20.0 NEPHROLITHIASIS: Primary | ICD-10-CM

## 2018-03-05 DIAGNOSIS — N20.1 CALCULUS OF URETER: ICD-10-CM

## 2018-03-05 NOTE — TELEPHONE ENCOUNTER
Called patient to s/x stent removal - and he already had pullled that stent out already   S/x for appt 4/16 with Nima Salmeron and ordered a KUB

## 2018-03-15 LAB
CA PHOS MFR STONE: 10 %
CALCIUM OXALATE DIHYDRATE MFR STONE IR: 5 %
COLOR STONE: NORMAL
COM MFR STONE: 85 %
COMMENT-STONE3: NORMAL
COMPOSITION: NORMAL
LABORATORY COMMENT REPORT: NORMAL
NIDUS STONE QL: NORMAL
PHOTO: NORMAL
SIZE STONE: NORMAL MM
STONE ANALYSIS-IMP: NORMAL
WT STONE: 47.8 MG

## 2019-03-04 ENCOUNTER — TRANSCRIBE ORDERS (OUTPATIENT)
Dept: ADMINISTRATIVE | Facility: HOSPITAL | Age: 59
End: 2019-03-04

## 2019-03-04 DIAGNOSIS — R09.82 POSTNASAL DRIP: ICD-10-CM

## 2019-03-04 DIAGNOSIS — J31.0 CHRONIC RHINITIS: ICD-10-CM

## 2019-03-04 DIAGNOSIS — J32.9 CHRONIC SINUSITIS, UNSPECIFIED LOCATION: Primary | ICD-10-CM

## 2019-03-04 DIAGNOSIS — J34.3 HYPERTROPHY OF NASAL TURBINATES: ICD-10-CM

## 2019-03-08 ENCOUNTER — HOSPITAL ENCOUNTER (OUTPATIENT)
Dept: CT IMAGING | Facility: HOSPITAL | Age: 59
Discharge: HOME | End: 2019-03-08
Payer: OTHER GOVERNMENT

## 2019-03-08 DIAGNOSIS — R09.82 POSTNASAL DRIP: ICD-10-CM

## 2019-03-08 DIAGNOSIS — J34.3 HYPERTROPHY OF NASAL TURBINATES: ICD-10-CM

## 2019-03-08 DIAGNOSIS — J31.0 CHRONIC RHINITIS: ICD-10-CM

## 2019-03-08 DIAGNOSIS — J32.9 CHRONIC SINUSITIS, UNSPECIFIED LOCATION: ICD-10-CM

## 2019-03-08 PROCEDURE — 70486 CT MAXILLOFACIAL W/O DYE: CPT

## 2022-06-27 RX ORDER — LORAZEPAM 0.5 MG/1
0.5 TABLET ORAL EVERY 8 HOURS PRN
COMMUNITY
Start: 2015-11-02

## 2022-06-27 RX ORDER — ARIPIPRAZOLE 20 MG/1
20 TABLET ORAL DAILY
COMMUNITY

## 2022-06-27 RX ORDER — OXYCODONE HYDROCHLORIDE AND ACETAMINOPHEN 5; 325 MG/1; MG/1
2 TABLET ORAL EVERY 6 HOURS PRN
COMMUNITY
Start: 2015-11-02

## 2022-06-28 ENCOUNTER — OFFICE VISIT (OUTPATIENT)
Dept: GASTROENTEROLOGY | Facility: CLINIC | Age: 62
End: 2022-06-28
Payer: OTHER GOVERNMENT

## 2022-06-28 VITALS
SYSTOLIC BLOOD PRESSURE: 108 MMHG | HEART RATE: 74 BPM | OXYGEN SATURATION: 97 % | HEIGHT: 71 IN | DIASTOLIC BLOOD PRESSURE: 78 MMHG | WEIGHT: 281 LBS | BODY MASS INDEX: 39.34 KG/M2

## 2022-06-28 DIAGNOSIS — Z12.11 SCREENING FOR COLON CANCER: Primary | ICD-10-CM

## 2022-06-28 DIAGNOSIS — Z86.010 HISTORY OF COLON POLYPS: ICD-10-CM

## 2022-06-28 PROCEDURE — 99203 OFFICE O/P NEW LOW 30 MIN: CPT | Performed by: PHYSICIAN ASSISTANT

## 2022-06-28 RX ORDER — SODIUM PHOSPHATE,MONO-DIBASIC 19G-7G/118
ENEMA (ML) RECTAL
COMMUNITY
Start: 2022-06-01

## 2022-06-28 RX ORDER — CETIRIZINE HYDROCHLORIDE 10 MG/1
1 TABLET ORAL DAILY
COMMUNITY
Start: 2022-05-24

## 2022-06-28 RX ORDER — ERGOCALCIFEROL (VITAMIN D2) 1250 MCG
CAPSULE ORAL
COMMUNITY
Start: 2022-05-24

## 2022-06-28 RX ORDER — CHLORHEXIDINE GLUCONATE 4 G/100ML
SOLUTION TOPICAL
COMMUNITY
Start: 2022-05-24

## 2022-06-28 RX ORDER — SERTRALINE HYDROCHLORIDE 100 MG/1
50 TABLET, FILM COATED ORAL
COMMUNITY
Start: 2022-04-04

## 2022-06-28 RX ORDER — SULFAMETHOXAZOLE AND TRIMETHOPRIM 800; 160 MG/1; MG/1
TABLET ORAL
COMMUNITY
Start: 2022-06-01 | End: 2022-06-28

## 2022-06-28 RX ORDER — FLUTICASONE PROPIONATE 50 MCG
SPRAY, SUSPENSION (ML) NASAL
COMMUNITY
Start: 2022-05-24

## 2022-06-28 RX ORDER — LAMOTRIGINE 25 MG/1
25 TABLET ORAL
COMMUNITY
Start: 2022-04-04

## 2022-06-28 NOTE — LETTER
June 29, 2022     [facility], [address]    Patient: Antony Simon   YOB: 1960   Date of Visit: 6/28/2022       Dear Dr Carlos Coats: Thank you for referring Antony Simon to me for evaluation  Below are my notes for this consultation  If you have questions, please do not hesitate to call me  I look forward to following your patient along with you  Sincerely,        Maggy Barton PA-C        CC: No Recipients  Virginie Dominguez  6/28/2022  3:55 PM  Attested  [facility] - Outpatient Consultation  Antony Simon 63 y o  male MRN: [id number]  Encounter: [id number]          ASSESSMENT AND PLAN:      1  Screening for colon cancer  2  History of colon polyps  He has a history of colon polyps  Will update his colonoscopy at present    ______________________________________________________________________    HPI:  63year old male who is referred by the [place] for a screening colonoscopy  His last colonoscopy was several years ago  He has a history of colon polyps  He has no gastrointestinal symptoms at present  He notes that he had suffered with diarrhea for many years until his recently put on a new diabetic medication which seems to have resolved that problem  He has no family history for colorectal cancer  REVIEW OF SYSTEMS:    CONSTITUTIONAL: Denies any fever, chills, rigors, and weight loss  HEENT: No earache or tinnitus  Denies hearing loss or visual disturbances  CARDIOVASCULAR: No chest pain or palpitations  RESPIRATORY: Denies any cough, hemoptysis, shortness of breath or dyspnea on exertion  GASTROINTESTINAL: As noted in the History of Present Illness  GENITOURINARY: No problems with urination  Denies any hematuria or dysuria  NEUROLOGIC: No dizziness or vertigo, denies headaches  MUSCULOSKELETAL: Denies any muscle or joint pain  SKIN: Denies skin rashes or itching     ENDOCRINE: Denies excessive thirst  Denies intolerance to heat or cold  PSYCHOSOCIAL: Denies depression or anxiety  Denies any recent memory loss         Historical Information   Past Medical History:   Diagnosis Date    Cancer [facility])     Bladder 2011    Congestive [facility] failure ([facility]F) ([facility] ) 2017    CPAP (continuous positive airway pressure) dependence     Diabetes mellitus ([facility] )     Hiatal hernia     Hypertension     Kidney stone     Sleep apnea      Past Surgical History:   Procedure Laterality Date    CHOLECYSTECTOMY      CYSTOSCOPY      [place] CYSTO/URETERO W/LITHOTRIPSY &INDWELL STENT INSRT Right 3/2/2018    Procedure: CYSTOSCOPY URETEROSCOPY WITH LITHOTRIPSY HOLMIUM LASER, RETROGRADE PYELOGRAM AND INSERTION STENT URETERAL;  Surgeon: Lio Gresham MD;  Location: [facility] OR;  Service: Urology    TONSILLECTOMY      UMBILICAL HERNIA REPAIR      WISDOM TOOTH EXTRACTION Bilateral      Social History   Social History     Substance and Sexual Activity   Alcohol Use Yes    Alcohol/week: 4 0 standard drinks    Types: 4 Cans of beer per week     Social History     Substance and Sexual Activity   Drug Use No     Social History     Tobacco Use   Smoking Status Former Smoker   Smokeless Tobacco Never Used   Tobacco Comment    Quit 35 years ago     Family History   Problem Relation Age of Onset    Lung cancer Mother     No Known Problems Father     No Known Problems Brother     No Known Problems Brother        Meds/Allergies       Current Outpatient Medications:     acetaminophen (TYLENOL) 500 mg tablet    ACIDOPHILUS LACTOBACILLUS PO    ARIPiprazole (ABILIFY) 20 MG tablet    aspirin (ECOTRIN LOW STRENGTH) 81 mg EC tablet    ATORVASTATIN CALCIUM PO    benazepril (LOTENSIN) 40 MG tablet    cetirizine (ZyrTEC) 10 mg tablet    chlorhexidine (HIBICLENS) 4 % external liquid    cyanocobalamin 1000 MCG tablet    Empagliflozin 25 MG TABS    ergocalciferol (ERGOCALCIFEROL) 1 25 MG (53618 UT) capsule    ferrous sulfate 325 (65 Fe) mg tablet    fluticasone (FLONASE) 50 mcg/act nasal spray    furosemide (LASIX) 40 mg tablet    lamoTRIgine (LaMICtal) 25 mg tablet    LORazepam (ATIVAN) 0 5 mg tablet    magnesium oxide (MAG-OX) 400 mg tablet    metFORMIN (GLUCOPHAGE) 1000 MG tablet    metFORMIN (GLUCOPHAGE) 500 mg tablet    metoprolol tartrate (LOPRESSOR) 50 mg tablet    omeprazole (PriLOSEC) 20 mg delayed release capsule    oxyCODONE-acetaminophen (PERCOCET) 5-325 mg per tablet    sertraline (ZOLOFT) 100 mg tablet    sodium phosphate-biphosphate (FLEET) 7-19 g 118 mL enema    spironolactone (ALDACTONE) 25 mg tablet    sulfamethoxazole-trimethoprim (BACTRIM DS) 800-160 mg per tablet    No Known Allergies        Objective     Blood pressure 108/78, pulse 74, height 5' 11" (1 803 m), weight 127 kg (281 lb), SpO2 97 %  Body mass index is 39 19 kg/m²  PHYSICAL EXAM:      General Appearance:   Alert, cooperative, no distress   HEENT:   Normocephalic, atraumatic, anicteric      Neck:  Supple, symmetrical, trachea midline   Lungs:   Clear to auscultation bilaterally; no rales, rhonchi or wheezing; respirations unlabored    Heart[de-identified]   Regular rate and rhythm; no murmur, rub, or gallop  Abdomen:   Soft, non-tender, non-distended; normal bowel sounds; no masses, no organomegaly    Genitalia:   Deferred    Rectal:   Deferred    Extremities:  No cyanosis, clubbing or edema    Pulses:  2+ and symmetric    Skin:  No jaundice, rashes, or lesions    Lymph nodes:  No palpable cervical lymphadenopathy        Lab Results:   No visits with results within 1 Day(s) from this visit     Latest known visit with results is:   Lab Requisition on 03/02/2018   Component Date Value    COLOR 03/02/2018 Ariela Siueling Size 03/02/2018 Comment     Stone Weight 03/02/2018 47 8     Composition 03/02/2018 Comment     Ca Oxalate,Dihydrate 03/02/2018 05     Ca Oxalate,Monohydr  03/02/2018 85     CALCIUM PHOSPHATE 03/02/2018 10     Nidus 03/02/2018 No Nidus visualized     STONE COMMENT 03/02/2018 Comment     Please note 03/02/2018 Comment     Disclaimer 03/02/2018 Comment     Photo 03/02/2018 Comment          Radiology Results:   No results found  Attestation signed by Rigoberto Nash DO at 6/28/2022  4:34 PM:  I reviewed the chart  I supervised my physician assistant and I agree with her assessment and plan

## 2022-06-28 NOTE — PROGRESS NOTES
[facility] - Outpatient Consultation  Jose Newman 63 y o  male MRN: [id number]  Encounter: [id number]          ASSESSMENT AND PLAN:      1  Screening for colon cancer  2  History of colon polyps  He has a history of colon polyps  Will update his colonoscopy at present    ______________________________________________________________________    HPI:  63year old male who is referred by the [place] for a screening colonoscopy  His last colonoscopy was several years ago  He has a history of colon polyps  He has no gastrointestinal symptoms at present  He notes that he had suffered with diarrhea for many years until his recently put on a new diabetic medication which seems to have resolved that problem  He has no family history for colorectal cancer  REVIEW OF SYSTEMS:    CONSTITUTIONAL: Denies any fever, chills, rigors, and weight loss  HEENT: No earache or tinnitus  Denies hearing loss or visual disturbances  CARDIOVASCULAR: No chest pain or palpitations  RESPIRATORY: Denies any cough, hemoptysis, shortness of breath or dyspnea on exertion  GASTROINTESTINAL: As noted in the History of Present Illness  GENITOURINARY: No problems with urination  Denies any hematuria or dysuria  NEUROLOGIC: No dizziness or vertigo, denies headaches  MUSCULOSKELETAL: Denies any muscle or joint pain  SKIN: Denies skin rashes or itching  ENDOCRINE: Denies excessive thirst  Denies intolerance to heat or cold  PSYCHOSOCIAL: Denies depression or anxiety  Denies any recent memory loss         Historical Information   Past Medical History:   Diagnosis Date    Cancer [facility])     Bladder 2011    [facility] 2017    CPAP (continuous positive airway pressure) dependence     Diabetes mellitus ([facility] )     Hiatal hernia     Hypertension     Kidney stone     Sleep apnea      Past Surgical History:   Procedure Laterality Date    CHOLECYSTECTOMY      CYSTOSCOPY      [place] CYSTO/URETERO W/LITHOTRIPSY &INDWELL STENT INSRT Right 3/2/2018    Procedure: CYSTOSCOPY URETEROSCOPY WITH LITHOTRIPSY HOLMIUM LASER, RETROGRADE PYELOGRAM AND INSERTION STENT URETERAL;  Surgeon: Luci Delaney MD;  Location:  MAIN OR;  Service: Urology    TONSILLECTOMY      UMBILICAL HERNIA REPAIR      WISDOM TOOTH EXTRACTION Bilateral      Social History   Social History     Substance and Sexual Activity   Alcohol Use Yes    Alcohol/week: 4 0 standard drinks    Types: 4 Cans of beer per week     Social History     Substance and Sexual Activity   Drug Use No     Social History     Tobacco Use   Smoking Status Former Smoker   Smokeless Tobacco Never Used   Tobacco Comment    Quit 35 years ago     Family History   Problem Relation Age of Onset    Lung cancer Mother     No Known Problems Father     No Known Problems Brother     No Known Problems Brother        Meds/Allergies       Current Outpatient Medications:     acetaminophen (TYLENOL) 500 mg tablet    ACIDOPHILUS LACTOBACILLUS PO    ARIPiprazole (ABILIFY) 20 MG tablet    aspirin (ECOTRIN LOW STRENGTH) 81 mg EC tablet    ATORVASTATIN CALCIUM PO    benazepril (LOTENSIN) 40 MG tablet    cetirizine (ZyrTEC) 10 mg tablet    chlorhexidine (HIBICLENS) 4 % external liquid    cyanocobalamin 1000 MCG tablet    Empagliflozin 25 MG TABS    ergocalciferol (ERGOCALCIFEROL) 1 25 MG (59851 UT) capsule    ferrous sulfate 325 (65 Fe) mg tablet    fluticasone (FLONASE) 50 mcg/act nasal spray    furosemide (LASIX) 40 mg tablet    lamoTRIgine (LaMICtal) 25 mg tablet    LORazepam (ATIVAN) 0 5 mg tablet    magnesium oxide (MAG-OX) 400 mg tablet    metFORMIN (GLUCOPHAGE) 1000 MG tablet    metFORMIN (GLUCOPHAGE) 500 mg tablet    metoprolol tartrate (LOPRESSOR) 50 mg tablet    omeprazole (PriLOSEC) 20 mg delayed release capsule    oxyCODONE-acetaminophen (PERCOCET) 5-325 mg per tablet    sertraline (ZOLOFT) 100 mg tablet    sodium phosphate-biphosphate (FLEET) 7-19 g 118 mL enema    spironolactone (ALDACTONE) 25 mg tablet    sulfamethoxazole-trimethoprim (BACTRIM DS) 800-160 mg per tablet    No Known Allergies        Objective     Blood pressure 108/78, pulse 74, height 5' 11" (1 803 m), weight 127 kg (281 lb), SpO2 97 %  Body mass index is 39 19 kg/m²  PHYSICAL EXAM:      General Appearance:   Alert, cooperative, no distress   HEENT:   Normocephalic, atraumatic, anicteric      Neck:  Supple, symmetrical, trachea midline   Lungs:   Clear to auscultation bilaterally; no rales, rhonchi or wheezing; respirations unlabored    Heart[de-identified]   Regular rate and rhythm; no murmur, rub, or gallop  Abdomen:   Soft, non-tender, non-distended; normal bowel sounds; no masses, no organomegaly    Genitalia:   Deferred    Rectal:   Deferred    Extremities:  No cyanosis, clubbing or edema    Pulses:  2+ and symmetric    Skin:  No jaundice, rashes, or lesions    Lymph nodes:  No palpable cervical lymphadenopathy        Lab Results:   No visits with results within 1 Day(s) from this visit  Latest known visit with results is:   Lab Requisition on 03/02/2018   Component Date Value    COLOR 03/02/2018 Wrights Smart Size 03/02/2018 Comment     Stone Weight 03/02/2018 47 8     Composition 03/02/2018 Comment     Ca Oxalate,Dihydrate 03/02/2018 05     Ca Oxalate,Monohydr  03/02/2018 85     CALCIUM PHOSPHATE 03/02/2018 10     Nidus 03/02/2018 No Nidus visualized     STONE COMMENT 03/02/2018 Comment     Please note 03/02/2018 Comment     Disclaimer 03/02/2018 Comment     Photo 03/02/2018 Comment          Radiology Results:   No results found

## 2022-06-28 NOTE — PATIENT INSTRUCTIONS
Scheduled date of colonoscopy (as of today):9/9/22  Physician performing colonoscopy:Fernando  Location of colonoscopy:[place]  Bowel prep reviewed with patient:miralax/dulcolax  Instructions reviewed with patient by:Angely CUEVAS  Clearances:  none

## 2022-08-16 ENCOUNTER — TELEPHONE (OUTPATIENT)
Dept: UROLOGY | Facility: AMBULATORY SURGERY CENTER | Age: 62
End: 2022-08-16

## 2022-08-16 DIAGNOSIS — R97.20 ELEVATED PSA: Primary | ICD-10-CM

## 2022-08-16 NOTE — TELEPHONE ENCOUNTER
Received a fax from [place] Referral/records for pt to be sched an appt for "Cancer [place] 6 51/2 positive", Referral/records faxed to [facility] faxing to be scanned into pt chart    pt last seen 03/12/18 w/Dr Namrata Dillon   1st attempt to contact pt, lmom for pt to call the office to sched an appt    pt lives in [place] per pt chart, he may want to go to [facility]

## 2022-09-06 ENCOUNTER — OFFICE VISIT (OUTPATIENT)
Dept: UROLOGY | Facility: AMBULATORY SURGERY CENTER | Age: 62
End: 2022-09-06
Payer: COMMERCIAL

## 2022-09-06 VITALS
DIASTOLIC BLOOD PRESSURE: 78 MMHG | HEART RATE: 78 BPM | WEIGHT: 281 LBS | HEIGHT: 71 IN | SYSTOLIC BLOOD PRESSURE: 118 MMHG | BODY MASS INDEX: 39.34 KG/M2 | OXYGEN SATURATION: 97 %

## 2022-09-06 DIAGNOSIS — C61 PROSTATE CANCER (HCC): Primary | ICD-10-CM

## 2022-09-06 PROCEDURE — 99204 OFFICE O/P NEW MOD 45 MIN: CPT | Performed by: UROLOGY

## 2022-09-06 RX ORDER — GLIPIZIDE 10 MG/1
10 TABLET ORAL
COMMUNITY
Start: 2022-08-02

## 2022-09-06 NOTE — PROGRESS NOTES
9/6/2022    María Fraga  1960  [id number]        Assessment  [place] 6 prostate cancer, [facility] biopsy    Plan  Patient has researched extensively  He was informed by [facility] urology that he requires either surgery or XRT  I explained that we are routinely observing all [place] 6 patients with active surveillance  This includes, follow up PSAs, MRI, confirmatory biopsy  Do not do routine staging or recommend definitive therapy unless surveillance reveals disease progression  If his insurance will allow MRI, will proceed, as this is standard of care  If he cannot afford it, will need to follow with PSA and exam and hope not to miss any sign of progression  He understands and agrees after our discussion  History of Present Illness  Nathaly Moran is a 63 y o  male with PSA elevation to 4 1  He underwent prostate biopsy at [facility] and was found to have [place] 6 prostate cancer in 5/12 cores  He reports CT and bone scan were done with normal findings, but I do not have those results  He has no complaint or symptoms  Review of Systems  Review of Systems   Constitutional: Negative  HENT: Negative  Respiratory: Negative  Cardiovascular: Negative  Gastrointestinal: Negative  Genitourinary:        As per HPI   Musculoskeletal: Negative  Skin: Negative  Neurological: Negative  Hematological: Negative            Past Medical History  Past Medical History:   Diagnosis Date    [facility] )     Cancer ([facility] [facility] )     Bladder 2011    Congestive heart failure (CHF) ([facility] ) 2017    CPAP (continuous positive airway pressure) dependence     Diabetes mellitus ([address] )     Hiatal hernia     Hypertension     Kidney stone     Prostate CA ([facility] )     Sleep apnea        Past Social History  Past Surgical History:   Procedure Laterality Date    CHOLECYSTECTOMY      CYSTOSCOPY      [place] CYSTO/URETERO W/LITHOTRIPSY &INDWELL STENT INSRT Right 3/2/2018    Procedure: CYSTOSCOPY URETEROSCOPY WITH LITHOTRIPSY HOLMIUM LASER, RETROGRADE PYELOGRAM AND INSERTION STENT URETERAL;  Surgeon: Kasey Key MD;  Location:  MAIN OR;  Service: Urology    TONSILLECTOMY      UMBILICAL HERNIA REPAIR      WISDOM TOOTH EXTRACTION Bilateral        Past Family History  Family History   Problem Relation Age of Onset    Lung cancer Mother     No Known Problems Father     No Known Problems Brother     No Known Problems Brother        Past Social history  Social History     Socioeconomic History    Marital status: /Civil Union     Spouse name: Not on file    Number of children: Not on file    Years of education: Not on file    Highest education level: Not on file   Occupational History    Not on file   Tobacco Use    Smoking status: Former Smoker    Smokeless tobacco: Never Used    Tobacco comment: Quit 35 years ago   Vaping Use    Vaping Use: Never used   Substance and Sexual Activity    Alcohol use:  Yes     Alcohol/week: 4 0 standard drinks     Types: 4 Cans of beer per week    Drug use: No    Sexual activity: Not on file   Other Topics Concern    Not on file   Social History Narrative    Not on file     Social Determinants of Health     Financial Resource Strain: Not on file   Food Insecurity: Not on file   Transportation Needs: Not on file   Physical Activity: Not on file   Stress: Not on file   Social Connections: Not on file   Intimate Partner Violence: Not on file   Housing Stability: Not on file     Social History     Tobacco Use   Smoking Status Former Smoker   Smokeless Tobacco Never Used   Tobacco Comment    Quit 35 years ago       Current Medications  Current Outpatient Medications   Medication Sig Dispense Refill    acetaminophen (TYLENOL) 500 mg tablet Take 1,000 mg by mouth 2 (two) times a day      ACIDOPHILUS LACTOBACILLUS PO Take 1 tablet by mouth daily      ARIPiprazole (ABILIFY) 20 MG tablet Take 20 mg by mouth daily      aspirin (ECOTRIN LOW STRENGTH) 81 mg EC tablet Take 81 mg by mouth daily      ATORVASTATIN CALCIUM PO Take 20 mg by mouth every evening      benazepril (LOTENSIN) 40 MG tablet Take 40 mg by mouth daily      cetirizine (ZyrTEC) 10 mg tablet Take 1 tablet by mouth daily      chlorhexidine (HIBICLENS) 4 % external liquid Apply topically      cyanocobalamin 1000 MCG tablet Take 100 mcg by mouth daily      Empagliflozin 25 MG TABS Take 1 5 tablets by mouth daily      ergocalciferol (ERGOCALCIFEROL) 1 25 MG (85283 UT) capsule Take by mouth      ferrous sulfate 325 (65 Fe) mg tablet Take 325 mg by mouth 2 (two) times a day with meals      fluticasone (FLONASE) 50 mcg/act nasal spray into each nostril      furosemide (LASIX) 40 mg tablet Take 40 mg by mouth 2 (two) times a day      glipiZIDE (GLUCOTROL) 10 mg tablet 10 mg      lamoTRIgine (LaMICtal) 25 mg tablet Take 25 mg by mouth      LORazepam (ATIVAN) 0 5 mg tablet Take 0 5 mg by mouth every 8 (eight) hours as needed      magnesium oxide (MAG-OX) 400 mg tablet Take 400 mg by mouth 2 (two) times a day      metoprolol tartrate (LOPRESSOR) 50 mg tablet Take 75 mg by mouth every 12 (twelve) hours      omeprazole (PriLOSEC) 20 mg delayed release capsule Take 20 mg by mouth daily      oxyCODONE-acetaminophen (PERCOCET) 5-325 mg per tablet Take 2 tablets by mouth every 6 (six) hours as needed      sertraline (ZOLOFT) 100 mg tablet Take 50 mg by mouth      sodium phosphate-biphosphate (FLEET) 7-19 g 118 mL enema Insert into the rectum      spironolactone (ALDACTONE) 25 mg tablet Take 25 mg by mouth 2 (two) times a day      metFORMIN (GLUCOPHAGE) 1000 MG tablet Take 1,000 mg by mouth 2 (two) times a day with meals (Patient not taking: Reported on 9/6/2022)      metFORMIN (GLUCOPHAGE) 500 mg tablet Take 500 mg by mouth (Patient not taking: Reported on 9/6/2022)       No current facility-administered medications for this visit         Allergies  Allergies   Allergen Reactions    Empagliflozin Other (See Comments)  Metformin Diarrhea       Past Medical History, Social History, Family History, medications and allergies were reviewed  Vitals  Vitals:    09/06/22 1034   BP: 118/78   Pulse: 78   SpO2: 97%   Weight: 127 kg (281 lb)   Height: 5' 11" (1 803 m)       Physical Exam  Physical Exam  Vitals reviewed  Constitutional:       Appearance: He is well-developed  HENT:      Head: Normocephalic and atraumatic  Eyes:      Conjunctiva/sclera: Conjunctivae normal    Cardiovascular:      Rate and Rhythm: Normal rate  Pulmonary:      Effort: Pulmonary effort is normal    Abdominal:      Palpations: Abdomen is soft  Comments: Obesity    Genitourinary:     Comments: Prostate about 30g, smooth, no palpable nodules  Partially buried penis  Musculoskeletal:         General: Normal range of motion  Skin:     General: Skin is warm and dry  Neurological:      Mental Status: He is alert and oriented to person, place, and time     Psychiatric:         Mood and Affect: Mood normal            Results  No results found for: PSA  Lab Results   Component Value Date    CALCIUM 9 2 03/02/2018    K 4 6 03/02/2018    CO2 26 03/02/2018     03/02/2018    BUN 18 03/02/2018    CREATININE 1 47 (H) 03/02/2018     Lab Results   Component Value Date    WBC 8 54 03/02/2018    HGB 12 1 03/02/2018    HCT 36 0 (L) 03/02/2018    MCV 95 03/02/2018     03/02/2018

## 2022-09-09 ENCOUNTER — ANESTHESIA (OUTPATIENT)
Dept: GASTROENTEROLOGY | Facility: HOSPITAL | Age: 62
End: 2022-09-09

## 2022-09-09 ENCOUNTER — ANESTHESIA EVENT (OUTPATIENT)
Dept: GASTROENTEROLOGY | Facility: HOSPITAL | Age: 62
End: 2022-09-09

## 2022-09-09 ENCOUNTER — HOSPITAL ENCOUNTER (OUTPATIENT)
Dept: GASTROENTEROLOGY | Facility: HOSPITAL | Age: 62
Setting detail: OUTPATIENT SURGERY
End: 2022-09-09
Payer: COMMERCIAL

## 2022-09-09 VITALS
BODY MASS INDEX: 38.24 KG/M2 | DIASTOLIC BLOOD PRESSURE: 86 MMHG | OXYGEN SATURATION: 96 % | RESPIRATION RATE: 19 BRPM | TEMPERATURE: 98.1 F | SYSTOLIC BLOOD PRESSURE: 139 MMHG | WEIGHT: 273.15 LBS | HEIGHT: 71 IN | HEART RATE: 92 BPM

## 2022-09-09 DIAGNOSIS — Z12.11 SCREENING FOR COLON CANCER: ICD-10-CM

## 2022-09-09 DIAGNOSIS — Z86.010 HISTORY OF COLON POLYPS: ICD-10-CM

## 2022-09-09 LAB — GLUCOSE SERPL-MCNC: 119 MG/DL (ref 65–140)

## 2022-09-09 PROCEDURE — 45385 COLONOSCOPY W/LESION REMOVAL: CPT | Performed by: INTERNAL MEDICINE

## 2022-09-09 PROCEDURE — 88305 TISSUE EXAM BY PATHOLOGIST: CPT | Performed by: PATHOLOGY

## 2022-09-09 PROCEDURE — 82948 REAGENT STRIP/BLOOD GLUCOSE: CPT

## 2022-09-09 RX ORDER — SODIUM CHLORIDE, SODIUM LACTATE, POTASSIUM CHLORIDE, CALCIUM CHLORIDE 600; 310; 30; 20 MG/100ML; MG/100ML; MG/100ML; MG/100ML
INJECTION, SOLUTION INTRAVENOUS CONTINUOUS PRN
Status: DISCONTINUED | OUTPATIENT
Start: 2022-09-09 | End: 2022-09-09

## 2022-09-09 RX ORDER — LIDOCAINE HYDROCHLORIDE 10 MG/ML
INJECTION, SOLUTION EPIDURAL; INFILTRATION; INTRACAUDAL; PERINEURAL AS NEEDED
Status: DISCONTINUED | OUTPATIENT
Start: 2022-09-09 | End: 2022-09-09

## 2022-09-09 RX ORDER — PROPOFOL 10 MG/ML
INJECTION, EMULSION INTRAVENOUS AS NEEDED
Status: DISCONTINUED | OUTPATIENT
Start: 2022-09-09 | End: 2022-09-09

## 2022-09-09 RX ADMIN — LIDOCAINE HYDROCHLORIDE 5 ML: 10 INJECTION, SOLUTION EPIDURAL; INFILTRATION; INTRACAUDAL; PERINEURAL at 10:10

## 2022-09-09 RX ADMIN — PROPOFOL 50 MG: 10 INJECTION, EMULSION INTRAVENOUS at 10:15

## 2022-09-09 RX ADMIN — PROPOFOL 30 MG: 10 INJECTION, EMULSION INTRAVENOUS at 10:21

## 2022-09-09 RX ADMIN — SODIUM CHLORIDE, SODIUM LACTATE, POTASSIUM CHLORIDE, AND CALCIUM CHLORIDE: .6; .31; .03; .02 INJECTION, SOLUTION INTRAVENOUS at 09:46

## 2022-09-09 RX ADMIN — PROPOFOL 200 MG: 10 INJECTION, EMULSION INTRAVENOUS at 10:11

## 2022-09-09 NOTE — ANESTHESIA POSTPROCEDURE EVALUATION
Post-Op Assessment Note    CV Status:  Stable    Pain management: adequate     Mental Status:  Alert and awake   Hydration Status:  Euvolemic   PONV Controlled:  Controlled   Airway Patency:  Patent      Post Op Vitals Reviewed: Yes      Staff: CRNA         No complications documented      /69 (09/09/22 1029)    Temp 98 1 °F (36 7 °C) (09/09/22 1029)    Pulse 91 (09/09/22 1029)   Resp 14 (09/09/22 1029)    SpO2 93 % (09/09/22 1029)

## 2022-09-09 NOTE — ANESTHESIA PREPROCEDURE EVALUATION
Procedure:  COLONOSCOPY    Relevant Problems   No relevant active problems      Obesity (BMI 38)  CHF - NM Scan 2015: LVEF 45%, no ischemia  HTN  HLD  ANIL on CPAP  NIIDM - POC   Uncomplicated opioid dependence  Hiatal hernia  H/o bladder CA, prostate CA    Physical Exam    Airway    Mallampati score: II  TM Distance: >3 FB  Neck ROM: full     Dental   No notable dental hx     Cardiovascular      Pulmonary      Other Findings        Anesthesia Plan  ASA Score- 3     Anesthesia Type- IV sedation with anesthesia with ASA Monitors  Additional Monitors:   Airway Plan:           Plan Factors-Exercise tolerance (METS): >4 METS  Chart reviewed  Existing labs reviewed  Patient summary reviewed  Induction- intravenous  Postoperative Plan-     Informed Consent- Anesthetic plan and risks discussed with patient  I personally reviewed this patient with the CRNA  Discussed and agreed on the Anesthesia Plan with the CRNA  Irineo Prasad

## 2022-09-09 NOTE — H&P
History and Physical - [facility]  Micky Gongora 63 y o  male MRN: [id number]      HPI: Micky Gongora is a 63y o  year old male who presents for a history of colon polyps      REVIEW OF SYSTEMS: Per the HPI, and otherwise unremarkable  Historical Information   Past Medical History:   Diagnosis Date    [facility] )     Cancer [facility])     Bladder 2011    Colon polyp     [facility] 2017    CPAP (continuous positive airway pressure) dependence     Diabetes mellitus ([facility] )     Hiatal hernia     Hypertension     Kidney stone     Prostate CA ([place] [address] )     Sleep apnea      Past Surgical History:   Procedure Laterality Date    CHOLECYSTECTOMY      COLONOSCOPY      CYSTOSCOPY      [place] CYSTO/URETERO W/LITHOTRIPSY &INDWELL STENT INSRT Right 03/02/2018    Procedure: CYSTOSCOPY URETEROSCOPY WITH LITHOTRIPSY HOLMIUM LASER, RETROGRADE PYELOGRAM AND INSERTION STENT URETERAL;  Surgeon: Kenyetta Cavanaugh MD;  [facility];  Service: Urology    TONSILLECTOMY      UMBILICAL HERNIA REPAIR      WISDOM TOOTH EXTRACTION Bilateral      Social History   Social History     Substance and Sexual Activity   Alcohol Use Yes    Alcohol/week: 4 0 standard drinks    Types: 4 Cans of beer per week     Social History     Substance and Sexual Activity   Drug Use Never     Social History     Tobacco Use   Smoking Status Former Smoker   Smokeless Tobacco Never Used   Tobacco Comment    Quit 36 years ago     Family History   Problem Relation Age of Onset    Lung cancer Mother     No Known Problems Father     No Known Problems Brother     No Known Problems Brother        Meds/Allergies     (Not in a hospital admission)      Allergies   Allergen Reactions    Empagliflozin Other (See Comments)    Metformin Diarrhea       Objective     Blood pressure 145/85, pulse (!) 106, temperature (!) 97 4 °F (36 3 °C), temperature source Temporal, resp   rate 20, height 5' 11" (1 803 m), weight 124 kg (273 lb 2 4 oz), SpO2 97 %  PHYSICAL EXAM    Gen: NAD  CV: RRR  CHEST: Clear  ABD: soft, NT/ND  EXT: no edema      ASSESSMENT/PLAN:  This is a 63y o  year old male here for colonoscopy, and he is stable and optimized for his procedure

## 2022-09-15 PROCEDURE — 88305 TISSUE EXAM BY PATHOLOGIST: CPT | Performed by: PATHOLOGY

## 2022-09-16 ENCOUNTER — TELEPHONE (OUTPATIENT)
Dept: GASTROENTEROLOGY | Facility: CLINIC | Age: 62
End: 2022-09-16

## 2022-09-16 NOTE — TELEPHONE ENCOUNTER
----- Message from Larry Wesley DO sent at 9/15/2022  4:36 PM EDT -----  Please call the patient with a polyp results  Both polyps in the sigmoid colon were precancerous adenomas and completely removed    The patient is due for repeat colonoscopy in 5 years

## 2022-10-09 ENCOUNTER — TELEPHONE (OUTPATIENT)
Dept: UROLOGY | Facility: AMBULATORY SURGERY CENTER | Age: 62
End: 2022-10-09

## 2022-10-10 NOTE — TELEPHONE ENCOUNTER
Regarding: Confusion   ----- Message from Arben Davalos RN sent at 10/7/2022 10:10 AM EDT -----       ----- Message from Augusta Botello to Baldemar Beckham MD sent at 10/5/2022  4:55 PM -----   I don't understand what the form attached to the appointment for December 6th it says complete by December 6th and it doesn't look like there's anything to fill out and a second question I have is I see that there is an MRI scheduled for January 6th and then they follow up on January 19th our future MRI visits and Falls going to be scheduled for me in the same fashion every 6 months or so if you could message

## 2022-10-10 NOTE — TELEPHONE ENCOUNTER
Please call to clarify what form he is referring to  May be order to MRI, which is already in system  He was last seen in the office one month, would recommend checking if there is an earlier MRI and follow up appointment available

## 2023-01-06 ENCOUNTER — HOSPITAL ENCOUNTER (OUTPATIENT)
Dept: RADIOLOGY | Age: 63
Discharge: HOME | End: 2023-01-06

## 2023-01-06 DIAGNOSIS — C61 PROSTATE CANCER (HCC): ICD-10-CM

## 2023-01-06 RX ADMIN — GADOBUTROL 12 ML: 604.72 INJECTION INTRAVENOUS at 09:39

## 2023-01-17 ENCOUNTER — TELEPHONE (OUTPATIENT)
Dept: UROLOGY | Facility: AMBULATORY SURGERY CENTER | Age: 63
End: 2023-01-17

## 2023-01-17 NOTE — TELEPHONE ENCOUNTER
Called patient to remind him that he does need his PSA prior to his appt  PT did not answer left detailed message explaining everything

## 2023-01-17 NOTE — TELEPHONE ENCOUNTER
Per patient's phone call, he will not be able to have labs done until tomorrow  He wants to know if the results will be back in time for his appt on Thursday

## 2023-01-18 ENCOUNTER — APPOINTMENT (OUTPATIENT)
Dept: LAB | Facility: CLINIC | Age: 63
End: 2023-01-18

## 2023-01-18 DIAGNOSIS — C61 PROSTATE CANCER (HCC): ICD-10-CM

## 2023-01-18 DIAGNOSIS — R97.20 ELEVATED PSA: ICD-10-CM

## 2023-01-18 LAB
ANION GAP SERPL CALCULATED.3IONS-SCNC: 3 MMOL/L (ref 4–13)
BUN SERPL-MCNC: 15 MG/DL (ref 5–25)
CALCIUM SERPL-MCNC: 9.1 MG/DL (ref 8.3–10.1)
CHLORIDE SERPL-SCNC: 105 MMOL/L (ref 96–108)
CO2 SERPL-SCNC: 31 MMOL/L (ref 21–32)
CREAT SERPL-MCNC: 1.17 MG/DL (ref 0.6–1.3)
GFR SERPL CREATININE-BSD FRML MDRD: 67 ML/MIN/1.73SQ M
GLUCOSE SERPL-MCNC: 120 MG/DL (ref 65–140)
POTASSIUM SERPL-SCNC: 4 MMOL/L (ref 3.5–5.3)
PSA SERPL-MCNC: 4.6 NG/ML (ref 0–4)
SODIUM SERPL-SCNC: 139 MMOL/L (ref 135–147)

## (undated) DEVICE — SKIN MARKER DUAL TIP WITH RULER CAP, FLEXIBLE RULER AND LABELS: Brand: DEVON

## (undated) DEVICE — SCD SEQUENTIAL COMPRESSION COMFORT SLEEVE MEDIUM KNEE LENGTH: Brand: KENDALL SCD

## (undated) DEVICE — SURGICAL GOWN, XL SMARTSLEEVE: Brand: CONVERTORS

## (undated) DEVICE — GLOVE INDICATOR PI UNDERGLOVE SZ 7.5 BLUE

## (undated) DEVICE — SYRINGE 10ML LL

## (undated) DEVICE — CYSTO TUBING TUR Y IRRIGATION

## (undated) DEVICE — CYSTOSCOPY PACK: Brand: CONVERTORS

## (undated) DEVICE — STANDARD SURGICAL GOWN, L: Brand: CONVERTORS

## (undated) DEVICE — 3M™ TEGADERM™ TRANSPARENT FILM DRESSING FRAME STYLE, 1624W, 2-3/8 IN X 2-3/4 IN (6 CM X 7 CM), 100/CT 4CT/CASE: Brand: 3M™ TEGADERM™

## (undated) DEVICE — ARTHROSCOPY FLOOR MAT

## (undated) DEVICE — BASKET SPEC RETRVL 3FR 90CM SS

## (undated) DEVICE — BASKET STONE RTRVL ZERO TIP 2.4FR

## (undated) DEVICE — CATH URETERAL 5FR X 70 CM FLEX TIP POLYUR BARD

## (undated) DEVICE — CATH URET .038 10FR 50CM DUAL LUMEN

## (undated) DEVICE — SPONGE STICK WITH PVP-I: Brand: KENDALL

## (undated) DEVICE — GLOVE SRG BIOGEL 7

## (undated) DEVICE — SNAP KOVER: Brand: UNBRANDED

## (undated) DEVICE — GAUZE SPONGES,16 PLY: Brand: CURITY

## (undated) DEVICE — GUIDEWIRE STRGHT TIP 0.035 IN  SOLO PLUS

## (undated) DEVICE — UROCATCH BAG

## (undated) DEVICE — LASER FIBER HOLMIUM 272MICRON

## (undated) DEVICE — TUBING SUCTION 5MM X 12 FT

## (undated) DEVICE — GLOVE SRG BIOGEL ECLIPSE 7.5